# Patient Record
Sex: FEMALE | Race: WHITE | Employment: OTHER | URBAN - METROPOLITAN AREA
[De-identification: names, ages, dates, MRNs, and addresses within clinical notes are randomized per-mention and may not be internally consistent; named-entity substitution may affect disease eponyms.]

---

## 2022-04-22 ENCOUNTER — DOCUMENTATION ONLY (OUTPATIENT)
Dept: PULMONOLOGY | Age: 82
End: 2022-04-22

## 2022-04-22 NOTE — PROGRESS NOTES
Tried to reach pt to set up new patient appt. No answer and no voicemail on the first number, unable to leave message on second number due to mailbox full. Will mail letter. Ref in filing cabinet.

## 2022-05-02 NOTE — PROGRESS NOTES
Pt received letter and called our office stating that she [place] want [place] [facility] and does not know why her ref was even sent here. Ref will be shred. Pt to call Dr. Dinesh [place] to get ref to office in [address] [facility].